# Patient Record
Sex: FEMALE | URBAN - METROPOLITAN AREA
[De-identification: names, ages, dates, MRNs, and addresses within clinical notes are randomized per-mention and may not be internally consistent; named-entity substitution may affect disease eponyms.]

---

## 2024-02-13 ENCOUNTER — OV NP (OUTPATIENT)
Dept: URBAN - METROPOLITAN AREA CLINIC 107 | Facility: CLINIC | Age: 44
End: 2024-02-13
Payer: SELF-PAY

## 2024-02-13 VITALS
HEIGHT: 67 IN | HEART RATE: 66 BPM | DIASTOLIC BLOOD PRESSURE: 79 MMHG | TEMPERATURE: 98 F | BODY MASS INDEX: 21.97 KG/M2 | SYSTOLIC BLOOD PRESSURE: 127 MMHG | WEIGHT: 140 LBS

## 2024-02-13 DIAGNOSIS — R10.13 EPIGASTRIC ABDOMINAL PAIN: ICD-10-CM

## 2024-02-13 DIAGNOSIS — Z12.11 COLON CANCER SCREENING: ICD-10-CM

## 2024-02-13 PROCEDURE — 99204 OFFICE O/P NEW MOD 45 MIN: CPT | Performed by: NURSE PRACTITIONER

## 2024-02-13 RX ORDER — PANTOPRAZOLE SODIUM 40 MG/1
1 TABLET TABLET, DELAYED RELEASE ORAL ONCE A DAY
Qty: 30 | Refills: 3 | OUTPATIENT
Start: 2024-02-13

## 2024-02-13 NOTE — HPI-TODAY'S VISIT:
This is a 43-year-old female with no past medical history presenting for evaluation of abdominal pain. She reports a 5 or 6-month history of pain indicated in the epigastrium and occasionally radiating to the left upper quadrant, right upper quadrant, or toward her umbilicus.  Has been occurring every day.  At first, it was intense.  Intensity has decreased.  It occurs more in the evenings then during the day.  She reports daytime soreness and pain in the evenings.  It is not associated with meals.  Occasionally, she wakes in the night with pain.  There is also no association with bowel movements.  There are no remediating factors.  She has not tried over-the-counter medications.  She denies dysphagia, heartburn, nausea.  She has a bowel movement every 2 or 3 days which is her normal bowel pattern.  She denies hard stools or straining.  She denies red blood per rectum or melena.  She denies weight loss. She takes 7 or 8 over-the-counter supplements every morning.  She does not have a primary care physician.  She has not sought care at urgent care or an emergency department visit.

## 2024-02-13 NOTE — PHYSICAL EXAM GASTROINTESTINAL
Abdomen , soft, mildly tender epigastrium, nondistended , no guarding or rigidity , no masses palpable , normal bowel sounds , Liver and Spleen , no hepatosplenomegaly